# Patient Record
Sex: MALE | HISPANIC OR LATINO | URBAN - METROPOLITAN AREA
[De-identification: names, ages, dates, MRNs, and addresses within clinical notes are randomized per-mention and may not be internally consistent; named-entity substitution may affect disease eponyms.]

---

## 2018-04-11 ENCOUNTER — CONSULT (OUTPATIENT)
Dept: URBAN - METROPOLITAN AREA CLINIC 44 | Facility: CLINIC | Age: 53
End: 2018-04-11
Payer: MEDICAID

## 2018-04-11 DIAGNOSIS — H16.031 CORNEAL ULCER WITH HYPOPYON OF RIGHT EYE: Primary | ICD-10-CM

## 2018-04-11 DIAGNOSIS — H18.731: ICD-10-CM

## 2018-04-11 DIAGNOSIS — H33.021 RETINAL DETACHMENT WITH MULTIPLE BREAKS, RIGHT EYE: Primary | ICD-10-CM

## 2018-04-11 PROCEDURE — 92004 COMPRE OPH EXAM NEW PT 1/>: CPT | Performed by: OPHTHALMOLOGY

## 2018-04-11 PROCEDURE — 76512 OPH US DX B-SCAN: CPT | Performed by: OPHTHALMOLOGY

## 2018-04-11 ASSESSMENT — INTRAOCULAR PRESSURE
OS: 12
OS: 12

## 2018-06-18 ENCOUNTER — POST-OPERATIVE VISIT (OUTPATIENT)
Dept: URBAN - METROPOLITAN AREA CLINIC 40 | Facility: CLINIC | Age: 53
End: 2018-06-18
Payer: MEDICAID

## 2018-06-18 DIAGNOSIS — Z09 ENCNTR FOR F/U EXAM AFT TRTMT FOR COND OTH THAN MALIG NEOPLM: Primary | ICD-10-CM

## 2018-06-18 PROCEDURE — 99024 POSTOP FOLLOW-UP VISIT: CPT | Performed by: OPTOMETRIST

## 2018-06-19 ENCOUNTER — OFFICE VISIT (OUTPATIENT)
Dept: URBAN - METROPOLITAN AREA CLINIC 23 | Facility: CLINIC | Age: 53
End: 2018-06-19
Payer: COMMERCIAL

## 2018-06-19 DIAGNOSIS — H11.33 CONJUNCTIVAL HEMORRHAGE, BILATERAL: ICD-10-CM

## 2018-06-19 PROCEDURE — V2627 SCLERAL COVER SHELL: HCPCS | Performed by: OPTOMETRIST

## 2018-06-19 PROCEDURE — V2628 FABRICATION & FITTING: HCPCS | Performed by: OPTOMETRIST

## 2018-06-19 PROCEDURE — 99213 OFFICE O/P EST LOW 20 MIN: CPT | Performed by: OPTOMETRIST

## 2018-06-19 ASSESSMENT — INTRAOCULAR PRESSURE: OS: 14

## 2018-06-19 NOTE — IMPRESSION/PLAN
Impression: Anophthalmos: Q11.1.  Plan: Pt needs proshtesis, disp mod conformer, needs shell [id number] swd RTC 2 months PRFU pt happy,

## 2018-08-17 ENCOUNTER — OFFICE VISIT (OUTPATIENT)
Dept: URBAN - METROPOLITAN AREA CLINIC 32 | Facility: CLINIC | Age: 53
End: 2018-08-17
Payer: COMMERCIAL

## 2018-08-17 DIAGNOSIS — Q11.1 ANOPHTHALMOS: ICD-10-CM

## 2018-08-17 PROCEDURE — 99213 OFFICE O/P EST LOW 20 MIN: CPT | Performed by: OPTOMETRIST

## 2018-08-17 PROCEDURE — V2625 ENLARGEMNT OF EYE PROSTHESIS: HCPCS | Performed by: OPTOMETRIST

## 2018-08-17 ASSESSMENT — INTRAOCULAR PRESSURE: OS: 15

## 2018-08-17 NOTE — IMPRESSION/PLAN
Impression: Anophthalmos: Q11.1.  Plan: Pt happy wiht proshtesis AT PRN RTC 3 months PRFU, enlarged prosthesis today

## 2019-04-18 ENCOUNTER — OFFICE VISIT (OUTPATIENT)
Dept: URBAN - METROPOLITAN AREA CLINIC 32 | Facility: CLINIC | Age: 54
End: 2019-04-18
Payer: COMMERCIAL

## 2019-04-18 DIAGNOSIS — H25.12 AGE-RELATED NUCLEAR CATARACT OF LEFT EYE: ICD-10-CM

## 2019-04-18 PROCEDURE — V2624 POLISHING ARTIFICAL EYE: HCPCS | Performed by: OPTOMETRIST

## 2019-04-18 PROCEDURE — 99214 OFFICE O/P EST MOD 30 MIN: CPT | Performed by: OPTOMETRIST

## 2019-04-18 ASSESSMENT — INTRAOCULAR PRESSURE: OS: 15

## 2019-04-18 NOTE — IMPRESSION/PLAN
Impression: Age-related nuclear cataract of left eye: H25.12. Plan: Cataracts account for the patient's complaints. Patient education was discussed regarding cataracts, lens options and surgery. Recommend Cataract consultation with surgeon. Order A-Scan and Corneal Topography. Hold off on filling any new glasses prescriptions until after the consultation. RTC 1-2 weeks for cataract consultation.

## 2020-07-01 ENCOUNTER — OFFICE VISIT (OUTPATIENT)
Dept: URBAN - METROPOLITAN AREA CLINIC 23 | Facility: CLINIC | Age: 55
End: 2020-07-01
Payer: COMMERCIAL

## 2020-07-01 DIAGNOSIS — H25.812 COMBINED FORMS OF AGE-RELATED CATARACT, LEFT EYE: Primary | ICD-10-CM

## 2020-07-01 PROCEDURE — 99214 OFFICE O/P EST MOD 30 MIN: CPT | Performed by: OPHTHALMOLOGY

## 2020-07-01 RX ORDER — PREDNISOLONE ACETATE 10 MG/ML
1 % SUSPENSION/ DROPS OPHTHALMIC
Qty: 10 | Refills: 1 | Status: INACTIVE
Start: 2020-07-01 | End: 2020-08-14

## 2020-07-01 RX ORDER — OFLOXACIN 3 MG/ML
0.3 % SOLUTION/ DROPS OPHTHALMIC
Qty: 5 | Refills: 1 | Status: INACTIVE
Start: 2020-07-01 | End: 2020-08-13

## 2020-07-01 RX ORDER — FLUOXETINE HYDROCHLORIDE 40 MG/1
40 MG CAPSULE ORAL
Qty: 0 | Refills: 0 | Status: ACTIVE
Start: 2020-07-01

## 2020-07-01 RX ORDER — GABAPENTIN 300 MG/1
300 MG CAPSULE ORAL
Qty: 0 | Refills: 0 | Status: ACTIVE
Start: 2020-07-01

## 2020-07-01 ASSESSMENT — VISUAL ACUITY: OS: 20/60

## 2020-07-01 ASSESSMENT — KERATOMETRY: OS: 45.25

## 2020-07-01 ASSESSMENT — INTRAOCULAR PRESSURE: OS: 15

## 2020-08-04 ENCOUNTER — PRE-OPERATIVE VISIT (OUTPATIENT)
Dept: URBAN - METROPOLITAN AREA CLINIC 23 | Facility: CLINIC | Age: 55
End: 2020-08-04
Payer: COMMERCIAL

## 2020-08-04 PROCEDURE — 92136 OPHTHALMIC BIOMETRY: CPT | Performed by: OPHTHALMOLOGY

## 2020-08-04 ASSESSMENT — PACHYMETRY
OS: 4.13
OS: 28.32

## 2020-08-06 ENCOUNTER — SURGERY (OUTPATIENT)
Dept: URBAN - METROPOLITAN AREA SURGERY 11 | Facility: SURGERY | Age: 55
End: 2020-08-06
Payer: COMMERCIAL

## 2020-08-06 PROCEDURE — 66984 XCAPSL CTRC RMVL W/O ECP: CPT | Performed by: OPHTHALMOLOGY

## 2020-08-07 ENCOUNTER — POST-OPERATIVE VISIT (OUTPATIENT)
Dept: URBAN - METROPOLITAN AREA CLINIC 23 | Facility: CLINIC | Age: 55
End: 2020-08-07
Payer: COMMERCIAL

## 2020-08-07 PROCEDURE — 99024 POSTOP FOLLOW-UP VISIT: CPT | Performed by: OPTOMETRIST

## 2020-08-07 ASSESSMENT — INTRAOCULAR PRESSURE: OS: 15

## 2020-08-13 ENCOUNTER — POST-OPERATIVE VISIT (OUTPATIENT)
Dept: URBAN - METROPOLITAN AREA CLINIC 23 | Facility: CLINIC | Age: 55
End: 2020-08-13
Payer: COMMERCIAL

## 2020-08-13 PROCEDURE — 99024 POSTOP FOLLOW-UP VISIT: CPT | Performed by: OPTOMETRIST

## 2020-08-13 PROCEDURE — 99024 POSTOP FOLLOW-UP VISIT: CPT | Performed by: OPHTHALMOLOGY

## 2020-08-13 RX ORDER — DORZOLAMIDE HYDROCHLORIDE 20 MG/ML
2 % SOLUTION OPHTHALMIC
Qty: 1 | Refills: 2 | Status: INACTIVE
Start: 2020-08-13 | End: 2020-08-13

## 2020-08-13 RX ORDER — BRIMONIDINE TARTRATE, TIMOLOL MALEATE 2; 5 MG/ML; MG/ML
SOLUTION/ DROPS OPHTHALMIC
Qty: 1 | Refills: 2 | Status: INACTIVE
Start: 2020-08-13 | End: 2020-08-13

## 2020-08-13 ASSESSMENT — INTRAOCULAR PRESSURE
OS: 31
OS: 31

## 2020-08-14 ENCOUNTER — POST-OPERATIVE VISIT (OUTPATIENT)
Dept: URBAN - METROPOLITAN AREA CLINIC 23 | Facility: CLINIC | Age: 55
End: 2020-08-14
Payer: COMMERCIAL

## 2020-08-14 PROCEDURE — 99024 POSTOP FOLLOW-UP VISIT: CPT | Performed by: OPTOMETRIST

## 2020-08-14 RX ORDER — TIMOLOL MALEATE 5 MG/ML
0.5 % SOLUTION/ DROPS OPHTHALMIC
Qty: 10 | Refills: 5 | Status: INACTIVE
Start: 2020-08-14 | End: 2021-09-21

## 2020-08-14 RX ORDER — PREDNISOLONE ACETATE 10 MG/ML
1 % SUSPENSION/ DROPS OPHTHALMIC
Qty: 10 | Refills: 1 | Status: INACTIVE
Start: 2020-08-14 | End: 2020-08-18

## 2020-08-14 RX ORDER — BRIMONIDINE TARTRATE 2 MG/ML
0.2 % SOLUTION/ DROPS OPHTHALMIC
Qty: 10 | Refills: 5 | Status: INACTIVE
Start: 2020-08-14 | End: 2021-09-21

## 2020-08-14 ASSESSMENT — INTRAOCULAR PRESSURE: OS: 19

## 2020-08-18 ENCOUNTER — POST-OPERATIVE VISIT (OUTPATIENT)
Dept: URBAN - METROPOLITAN AREA CLINIC 23 | Facility: CLINIC | Age: 55
End: 2020-08-18
Payer: COMMERCIAL

## 2020-08-18 PROCEDURE — 99024 POSTOP FOLLOW-UP VISIT: CPT | Performed by: OPTOMETRIST

## 2020-08-18 RX ORDER — PREDNISOLONE ACETATE 10 MG/ML
1 % SUSPENSION/ DROPS OPHTHALMIC
Qty: 10 | Refills: 1 | Status: INACTIVE
Start: 2020-08-18 | End: 2020-08-27

## 2020-08-18 ASSESSMENT — INTRAOCULAR PRESSURE: OS: 17

## 2020-08-25 ENCOUNTER — POST-OPERATIVE VISIT (OUTPATIENT)
Dept: URBAN - METROPOLITAN AREA CLINIC 23 | Facility: CLINIC | Age: 55
End: 2020-08-25

## 2020-08-25 PROCEDURE — 99024 POSTOP FOLLOW-UP VISIT: CPT | Performed by: OPTOMETRIST

## 2020-08-25 ASSESSMENT — INTRAOCULAR PRESSURE: OS: 14

## 2020-08-25 NOTE — IMPRESSION/PLAN
Impression:  Encounter for surgical aftercare following surgery on a sense organ  Z48.810.  Plan: RTC in one week with Dr. Asmita Garsia for a follow up --Continue Combigan TID OS

-- Fill Pred Acetate and continue Q2hr OS

## 2020-09-03 ENCOUNTER — POST-OPERATIVE VISIT (OUTPATIENT)
Dept: URBAN - METROPOLITAN AREA CLINIC 23 | Facility: CLINIC | Age: 55
End: 2020-09-03
Payer: COMMERCIAL

## 2020-09-03 DIAGNOSIS — Z48.810 ENCOUNTER FOR SURGICAL AFTERCARE FOLLOWING SURGERY ON A SENSE ORGAN: Primary | ICD-10-CM

## 2020-09-03 PROCEDURE — 99024 POSTOP FOLLOW-UP VISIT: CPT | Performed by: OPTOMETRIST

## 2020-09-03 ASSESSMENT — INTRAOCULAR PRESSURE: OS: 14

## 2020-09-03 NOTE — IMPRESSION/PLAN
Impression:  Encounter for surgical aftercare following surgery on a sense organ  Z48.810. Plan: Refer to retina for chronic inflammation, r/o retained lens material --Continue Pred-Acetate q2h OS, continue pressure reducing medication.

## 2020-09-18 ENCOUNTER — OFFICE VISIT (OUTPATIENT)
Dept: URBAN - METROPOLITAN AREA CLINIC 23 | Facility: CLINIC | Age: 55
End: 2020-09-18
Payer: COMMERCIAL

## 2020-09-18 DIAGNOSIS — H43.312 VITREOUS MEMBRANES AND STRANDS, LEFT EYE: ICD-10-CM

## 2020-09-18 DIAGNOSIS — H20.9 UVEITIS: Primary | ICD-10-CM

## 2020-09-18 DIAGNOSIS — H43.392 OTHER VITREOUS OPACITIES, LEFT EYE: ICD-10-CM

## 2020-09-18 PROCEDURE — 92134 CPTRZ OPH DX IMG PST SGM RTA: CPT | Performed by: OPHTHALMOLOGY

## 2020-09-18 PROCEDURE — 92014 COMPRE OPH EXAM EST PT 1/>: CPT | Performed by: OPHTHALMOLOGY

## 2020-09-18 ASSESSMENT — INTRAOCULAR PRESSURE: OS: 15

## 2020-09-18 NOTE — IMPRESSION/PLAN
Impression: Uveitis: H20.9. Left. Condition: unstable. Vision: vision affected. Patient is Monocular Plan: Discussed diagnosis in detail with patient. Exam OS shows lots od Debris. OCT and Optos OS shows Vitreous Strands. Recommend to continue using Prednisolone OS as recommended by Dr. Mino Ovalle. Will reassess condition in 1 month. Patient states he would like to be able to see better, would like to be able to go back to work and would like treatment for his left eye in order to improve his vision. Explained to patient that surgery can be consider in the future to remove the floaters - cautious due to patient is Monocular. Patient insist on treatment OS. Recommend IONA tx with Kenalog to help reduce the swelling for possible vision improvement. Discussed the risks and benefits of tx. All questions answered. Will consider surgery OS in the future for removal of Floaters / Debris.

## 2020-09-18 NOTE — IMPRESSION/PLAN
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 Left. Condition: unstable. Vision: vision affected. Plan: Discussed diagnosis in detail with patient. No treatment is required at this time. Will continue to observe condition and or symptoms. May consider surgery OS in the future to remove the floaters -  see notes above.

## 2020-09-18 NOTE — IMPRESSION/PLAN
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 Left. Vision: vision affected. Plan: see notes above.

## 2020-09-21 ENCOUNTER — PROCEDURE (OUTPATIENT)
Dept: URBAN - METROPOLITAN AREA CLINIC 32 | Facility: CLINIC | Age: 55
End: 2020-09-21
Payer: COMMERCIAL

## 2020-09-21 PROCEDURE — 67028 INJECTION EYE DRUG: CPT | Performed by: OPHTHALMOLOGY

## 2021-09-21 ENCOUNTER — OFFICE VISIT (OUTPATIENT)
Dept: URBAN - METROPOLITAN AREA CLINIC 23 | Facility: CLINIC | Age: 56
End: 2021-09-21
Payer: COMMERCIAL

## 2021-09-21 DIAGNOSIS — Z97.0 PRESENCE OF ARTIFICIAL EYE: ICD-10-CM

## 2021-09-21 PROCEDURE — 92134 CPTRZ OPH DX IMG PST SGM RTA: CPT | Performed by: OPHTHALMOLOGY

## 2021-09-21 PROCEDURE — 92012 INTRM OPH EXAM EST PATIENT: CPT | Performed by: OPHTHALMOLOGY

## 2021-09-21 ASSESSMENT — INTRAOCULAR PRESSURE: OS: 12

## 2021-09-21 NOTE — IMPRESSION/PLAN
Impression: Uveitis: H20.9. Left. Condition: unstable. Vision: vision affected. Patient is Monocular Plan: Due to Coronavirus COVID-19 pandemic and National Emergency, deferred Slit Lamp examination. Findings are based on diagnostic tests. OCT shows mild ILM changes and Optos shows stable. No treatment is require at this time.  Recommend a retina follow - up in 12 months

## 2021-09-21 NOTE — IMPRESSION/PLAN
Impression: Presence of artificial eye: Z97.0. Plan: Discussed diagnosis in detail with patient. Discussed treatment options with patient. Reassured patient of current condition and treatment. Referred to Dr. Trino Barfield for a Prosthesis Evaluation of the right eye,.

## 2022-03-09 ENCOUNTER — OFFICE VISIT (OUTPATIENT)
Dept: URBAN - METROPOLITAN AREA CLINIC 32 | Facility: CLINIC | Age: 57
End: 2022-03-09
Payer: COMMERCIAL

## 2022-03-09 PROCEDURE — V2628 FABRICATION & FITTING: HCPCS | Performed by: OPTOMETRIST

## 2022-03-09 PROCEDURE — 92014 COMPRE OPH EXAM EST PT 1/>: CPT | Performed by: OPTOMETRIST

## 2022-03-09 ASSESSMENT — INTRAOCULAR PRESSURE: OS: 17

## 2022-03-09 NOTE — IMPRESSION/PLAN
Impression: Anophthalmos: Q11.1. Plan: Patient needs a prosthetic eye due to the shrinking socket and atrophy of lids and surrounding tissue. Recommend a scleral shell with the possibility of socket and lid expansion with progressively larger prosthetic eyes.  12.0 3.5 5R [id number] see photo

## 2022-09-06 ENCOUNTER — OFFICE VISIT (OUTPATIENT)
Dept: URBAN - METROPOLITAN AREA CLINIC 32 | Facility: CLINIC | Age: 57
End: 2022-09-06
Payer: COMMERCIAL

## 2022-09-06 DIAGNOSIS — Q11.1 ANOPHTHALMOS: Primary | ICD-10-CM

## 2022-09-06 PROCEDURE — V2627 SCLERAL COVER SHELL: HCPCS | Performed by: OPTOMETRIST

## 2022-09-06 PROCEDURE — 92012 INTRM OPH EXAM EST PATIENT: CPT | Performed by: OPTOMETRIST

## 2022-09-06 ASSESSMENT — INTRAOCULAR PRESSURE: OS: 11

## 2024-03-29 ENCOUNTER — OFFICE VISIT (OUTPATIENT)
Dept: URBAN - METROPOLITAN AREA CLINIC 32 | Facility: CLINIC | Age: 59
End: 2024-03-29
Payer: COMMERCIAL

## 2024-03-29 DIAGNOSIS — Q11.1 ANOPHTHALMOS: Primary | ICD-10-CM

## 2024-03-29 PROCEDURE — 92012 INTRM OPH EXAM EST PATIENT: CPT | Performed by: OPTOMETRIST

## 2024-03-29 PROCEDURE — V2628 FABRICATION & FITTING: HCPCS | Performed by: OPTOMETRIST

## 2024-03-29 ASSESSMENT — INTRAOCULAR PRESSURE: OS: 14

## 2024-04-19 ENCOUNTER — OFFICE VISIT (OUTPATIENT)
Dept: URBAN - METROPOLITAN AREA CLINIC 32 | Facility: CLINIC | Age: 59
End: 2024-04-19
Payer: COMMERCIAL

## 2024-04-19 DIAGNOSIS — E11.9 TYPE 2 DIABETES MELLITUS W/O COMPLICATION: Primary | ICD-10-CM

## 2024-04-19 PROCEDURE — 92014 COMPRE OPH EXAM EST PT 1/>: CPT | Performed by: OPTOMETRIST

## 2024-04-19 ASSESSMENT — INTRAOCULAR PRESSURE: OS: 12

## 2024-05-10 ENCOUNTER — OFFICE VISIT (OUTPATIENT)
Dept: URBAN - METROPOLITAN AREA CLINIC 32 | Facility: CLINIC | Age: 59
End: 2024-05-10
Payer: COMMERCIAL

## 2024-05-10 DIAGNOSIS — Q11.1 ANOPHTHALMOS: Primary | ICD-10-CM

## 2024-05-10 PROCEDURE — V2627 SCLERAL COVER SHELL: HCPCS | Performed by: OPTOMETRIST

## 2024-05-10 PROCEDURE — 92012 INTRM OPH EXAM EST PATIENT: CPT | Performed by: OPTOMETRIST

## 2024-05-10 ASSESSMENT — INTRAOCULAR PRESSURE: OS: 11
